# Patient Record
Sex: FEMALE | Race: WHITE
[De-identification: names, ages, dates, MRNs, and addresses within clinical notes are randomized per-mention and may not be internally consistent; named-entity substitution may affect disease eponyms.]

---

## 2019-10-15 ENCOUNTER — HOSPITAL ENCOUNTER (OUTPATIENT)
Dept: HOSPITAL 35 - CV | Age: 24
End: 2019-10-15
Attending: PSYCHIATRY & NEUROLOGY
Payer: COMMERCIAL

## 2019-10-15 DIAGNOSIS — Z87.820: ICD-10-CM

## 2019-10-15 DIAGNOSIS — G43.019: Primary | ICD-10-CM

## 2019-10-15 LAB
ALBUMIN SERPL-MCNC: 4.3 G/DL (ref 3.4–5)
ALT SERPL-CCNC: 12 U/L (ref 30–65)
ANION GAP SERPL CALC-SCNC: 6 MMOL/L (ref 7–16)
AST SERPL-CCNC: 12 U/L (ref 15–37)
BILIRUB SERPL-MCNC: 0.7 MG/DL
BUN SERPL-MCNC: 7 MG/DL (ref 7–18)
CALCIUM SERPL-MCNC: 9.1 MG/DL (ref 8.5–10.1)
CHLORIDE SERPL-SCNC: 103 MMOL/L (ref 98–107)
CO2 SERPL-SCNC: 32 MMOL/L (ref 21–32)
CREAT SERPL-MCNC: 0.6 MG/DL (ref 0.6–1)
FOLATE SERPL-MCNC: 36.4 NG/ML (ref 8.6–58.9)
GLUCOSE SERPL-MCNC: 74 MG/DL (ref 74–106)
IRON SERPL-MCNC: 147 UG/DL (ref 50–170)
POTASSIUM SERPL-SCNC: 3.7 MMOL/L (ref 3.5–5.1)
PROT SERPL-MCNC: 7.1 G/DL (ref 6.4–8.2)
SAO2 % BLD FROM PO2: 46 % (ref 20–39)
SODIUM SERPL-SCNC: 141 MMOL/L (ref 136–145)
TIBC SERPL-MCNC: 317 UG/DL (ref 250–450)
TSH SERPL-ACNC: 1.44 UIU/ML (ref 0.36–3.74)
VIT B12 SERPL-MCNC: 855 PG/ML (ref 193–986)

## 2019-10-15 NOTE — 2DMMODE
[facility]
[place]  [address]
Phone:  [phone] 2 D/M-MODE ECHOCARDIOGRAM     
_______________________________________________________________________________
 
Name:            MICHELLE ENCISO           Room #:                    [facility]#:           [id number]          Account #:     [id number]  
Admission:       10/15/19         Attend Phys:   Lev Valerio, 
Discharge:                  Date of Birth: [birth year]  
                         Report #:      [id number]
        [id number]
_______________________________________________________________________________
THIS REPORT FOR:   //name//                          
 
 
--------------- APPROVED REPORT --------------
 
 
Study performed:  10/15/2019 14:12:49
 
EXAM: Comprehensive 2D, Doppler, and color-flow 
Echocardiogram 
Patient Location: Out-Patient   
      Status:  routine
 
   BSA:         1.60
HR: 63 bpm  
Rhythm: NSR  
 
Other Information 
Study Quality: Good
 
Indications
Murmur
 
2D Dimensions
RVDd:  31.77 mm  
IVSd:  8.37 (7-11mm) LVOT Diam:  20.17 (18-24mm) 
LVDd:  46.43 mm  
PWd:  8.42 (7-11mm) Ascending Ao:  29.24 (22-36mm)
LVDs:  32.66 (25-40mm) 
Aortic Root:  27.54 mm 
 
Volumes
Left Atrial Volume (Systole) 
Single Plane 4CH:  31.80 mL Single Plane 2CH:  25.54 mL
    LA ESV Index:  19.00 mL/m2
 
Aortic Valve
AoV Peak Ozzie.:  1.31 m/s 
AO Peak Gr.:  6.86 mmHg  LVOT Max P.63 mmHg
    LVOT Max V:  1.19 m/s
JULISSA Vmax: 2.89 cm2  
 
Mitral Valve
    E/A Ratio:  2.4
    MV Decel. Time:  214.88 ms
MV E Max Ozzie.:  0.99 m/s 
 
 

[address]
[place]  [address]
Phone:  [phone]                    2 D/M-MODE ECHOCARDIOGRAM     
_______________________________________________________________________________
 
Name:            MICHELLE ENCISO           Room #:                    [facility]#:           [id number]          Account #:     [id number]  
Admission:       10/15/19         Attend Phys:   Lev Valerio, 
Discharge:                  Date of Birth: [birth year]  
                         Report #:      [id number]
        [id number]
_______________________________________________________________________________
MV A Ozzie.:  0.41 m/s  
MV PHT:  62.32 ms  
IVRT:  59.98 ms   
 
Pulmonary Valve
PV Peak Ozzie.:  0.83 m/s PV Peak Gr.:  2.73 mmHg
 
Pulmonary Vein
P Vein S:    0.44 m/s 
P Vein D:   0.76 m/s 
P Vein S/D Ratio:  0.58 
 
Tricuspid Valve
TR Peak Ozzie.:  2.08 m/s  RAP Estimate:  5.00 mmHg
TR Peak Gr.:  17.30 mmHg 
    PA Pressure:  22.00 mmHg
 
Left Ventricle
The left ventricle is normal size. There is normal LV segmental wall 
motion. There is normal left ventricular wall thickness. Left 
ventricular systolic function is normal. LVEF is 55-60%. The left 
ventricular diastolic function is normal.
 
Right Ventricle
The right ventricle is normal size. The right ventricular systolic 
function is normal.
 
Atria
The left atrium size is normal. No PFO noted with color doppler. The 
right atrium size is normal.
 
Aortic Valve
The aortic valve is normal in structure. No aortic regurgitation is 
present. There is no aortic valvular stenosis.
 
Mitral Valve
The mitral valve is normal in structure. No mitral 
regurgitation.
 
Tricuspid Valve
The tricuspid valve is normal in structure. Mild tricuspid 
regurgitation. Estimated PAP is 20-25mmHg.
 
Pulmonic Valve
The pulmonary valve is normal in structure. Trace to mild pulmonic 
regurgitation.
 
 

[facility]
[place]  [address]
Phone:  [phone]                    2 D/M-MODE ECHOCARDIOGRAM     
_______________________________________________________________________________
 
Name:            MICHELLE ENCISO           Room #:                    REG [facility]
M.R.#:           [id number]          Account #:     [id number]  
Admission:       10/15/19         Attend Phys:   Lev Valerio, 
Discharge:                  Date of Birth: [birth year]  
                         Report #:      [id number]
        [id number]
_______________________________________________________________________________
 
Great Vessels
The aortic root is normal in size. The ascending aorta is normal in 
size. IVC is normal in size and collapses >50% with inspiration. 
Abnormal flow seen on limited views in the region of the 
aortopulmonary window. Peak systolic velocity of 5.0m/s and diastolic 
velocity of 3.2m/s. Continuous diatolic flow. Consider patent ductus 
arteriosus
 
Pericardium
There is no pericardial effusion.
 
<Conclusion>
Left ventricular systolic function is normal.
There is normal LV segmental wall motion.
LVEF is 55-60%. Normal diastolic function
The aortic valve is normal in structure. No aortic regurgitation or 
stenosis
The mitral valve is normal in structure. No mitral 
regurgitation.
Mild tricuspid regurgitation. Estimated pulmonary artery pressure of 
20-25mmHg.
There is no pericardial effusion.
Abnormal flow seen on limited views in the region of the 
aortopulmonary window. Peak systolic velocity of 5.0m/s and diastolic 
velocity of 3.2m/s, continuous diatolic flow. Consider patent ductus 
arteriosus
Additional or alternative imaging recommended.
 
 
 
 
 
 
 
 
 
 
 
 
 
 
 
 
  <ELECTRONICALLY SIGNED>
   By: Gerard Hall MD, FACC   
  10/15/19     1714
D: 10/15/19 1714                           _____________________________________
T: 10/15/19 1714                           Gerard Hall MD, FACC     /INF

## 2019-10-18 ENCOUNTER — HOSPITAL ENCOUNTER (OUTPATIENT)
Dept: HOSPITAL 61 - PCVCIMAG | Age: 24
Discharge: HOME | End: 2019-10-18
Attending: INTERNAL MEDICINE
Payer: MEDICAID

## 2019-10-18 DIAGNOSIS — J98.4: Primary | ICD-10-CM

## 2019-10-18 DIAGNOSIS — Z87.820: ICD-10-CM

## 2019-10-18 DIAGNOSIS — Z72.89: ICD-10-CM

## 2019-10-18 DIAGNOSIS — Q25.0: ICD-10-CM

## 2019-10-18 DIAGNOSIS — Z79.899: ICD-10-CM

## 2019-10-18 DIAGNOSIS — J45.909: ICD-10-CM

## 2019-10-18 DIAGNOSIS — E78.5: ICD-10-CM

## 2019-10-18 DIAGNOSIS — G43.019: ICD-10-CM

## 2019-10-18 PROCEDURE — 93308 TTE F-UP OR LMTD: CPT

## 2019-10-18 NOTE — PCVCIMAG
--------------- APPROVED REPORT --------------





Study performed:  10/18/2019 15:10:32



EXAM: Limited 2D, Doppler, and color-flow Echocardiogram

Patient Location: Echo lab

Status:  routine



BSA:         1.59

HR: 84 bpm

Rhythm: NSR



Other Information 

Study Quality: Adequate



Indications

Murmur

Patent Ductus Arteriosus



Pulmonic Valve

Mild pulmonic insufficiency



Great Vessels

High velocity flow in the region of the aortopulmonary window 

(4.8m/sec), continuous diastolic flow suggesting patent ductus 

arteriosus. Alternative imaging is recommended. Pulmonary artery 

appears normal in caliber.



<Conclusion>

Limited study with views from suprasternal notch

Pulmonary artery appears normal in caliber.

High velocity flow in the region of the aortopulmonary window 

(4.8m/sec), continuous diastolic flow suggesting patent ductus 

arteriosus

Mild pulmonic insufficiency

Alternative imaging is recommended.

## 2019-11-06 ENCOUNTER — APPOINTMENT (RX ONLY)
Dept: URBAN - METROPOLITAN AREA CLINIC 141 | Facility: CLINIC | Age: 24
Setting detail: DERMATOLOGY
End: 2019-11-06

## 2019-11-06 DIAGNOSIS — D22 MELANOCYTIC NEVI: ICD-10-CM

## 2019-11-06 PROBLEM — D22.0 MELANOCYTIC NEVI OF LIP: Status: ACTIVE | Noted: 2019-11-06

## 2019-11-06 PROCEDURE — 99201: CPT

## 2019-11-06 PROCEDURE — ? DEFER

## 2019-11-06 PROCEDURE — ? COUNSELING

## 2019-11-06 ASSESSMENT — LOCATION ZONE DERM: LOCATION ZONE: LIP

## 2019-11-06 ASSESSMENT — LOCATION DETAILED DESCRIPTION DERM: LOCATION DETAILED: RIGHT NASOLABIAL FOLD

## 2019-11-06 ASSESSMENT — LOCATION SIMPLE DESCRIPTION DERM: LOCATION SIMPLE: RIGHT LIP

## 2019-11-06 NOTE — PROCEDURE: DEFER
Procedure To Be Performed At Next Visit: Shave Removal (Cosmetic)
Detail Level: Detailed
Introduction Text (Please End With A Colon): The following procedure was deferred:
Instructions (Optional): $350.00

## 2019-12-18 ENCOUNTER — APPOINTMENT (RX ONLY)
Dept: URBAN - METROPOLITAN AREA CLINIC 141 | Facility: CLINIC | Age: 24
Setting detail: DERMATOLOGY
End: 2019-12-18

## 2019-12-18 DIAGNOSIS — D22 MELANOCYTIC NEVI: ICD-10-CM

## 2019-12-18 PROBLEM — D48.5 NEOPLASM OF UNCERTAIN BEHAVIOR OF SKIN: Status: ACTIVE | Noted: 2019-12-18

## 2019-12-18 PROCEDURE — ? BIOPSY BY SHAVE METHOD (COSMETIC)

## 2019-12-18 ASSESSMENT — LOCATION SIMPLE DESCRIPTION DERM: LOCATION SIMPLE: RIGHT CHEEK

## 2019-12-18 ASSESSMENT — LOCATION ZONE DERM: LOCATION ZONE: FACE

## 2019-12-18 ASSESSMENT — LOCATION DETAILED DESCRIPTION DERM: LOCATION DETAILED: RIGHT INFERIOR MEDIAL MALAR CHEEK

## 2019-12-18 NOTE — PROCEDURE: BIOPSY BY SHAVE METHOD (COSMETIC)
Size Of Lesion In Cm (Optional): 0
Post-Care Instructions: I reviewed with the patient in detail post-care instructions. Patient is to keep the biopsy site dry overnight, and then apply bacitracin twice daily until healed. Patient may apply hydrogen peroxide soaks to remove any crusting.
Lab Facility: [id number]
Anesthesia Type: 1% lidocaine with epinephrine
Billing Type: Third-Party Bill
Wound Care: Vaseline
Detail Level: Detailed
Price (Use Numbers Only, No Special Characters Or $): 300.00
Anesthesia Volume In Cc: 0.5
Consent: Written consent was obtained and risks were reviewed including but not limited to scarring, infection, bleeding, scabbing, incomplete removal, nerve damage and allergy to anesthesia.
Biopsy Type: H and E
Lab: [id number]
Render Path Notes In Note?: No
Notification Instructions: Patient will be notified of biopsy results. However, patient instructed to call the office if not contacted within 2 weeks.
Hemostasis: Drysol
Biopsy Method: Dermablade